# Patient Record
Sex: MALE | Race: BLACK OR AFRICAN AMERICAN
[De-identification: names, ages, dates, MRNs, and addresses within clinical notes are randomized per-mention and may not be internally consistent; named-entity substitution may affect disease eponyms.]

---

## 2023-07-31 ENCOUNTER — HOSPITAL ENCOUNTER (EMERGENCY)
Dept: HOSPITAL 33 - ED | Age: 23
Discharge: HOME | End: 2023-07-31
Payer: MEDICAID

## 2023-07-31 VITALS
DIASTOLIC BLOOD PRESSURE: 75 MMHG | HEART RATE: 89 BPM | SYSTOLIC BLOOD PRESSURE: 121 MMHG | OXYGEN SATURATION: 97 % | RESPIRATION RATE: 20 BRPM

## 2023-07-31 VITALS — TEMPERATURE: 98.3 F

## 2023-07-31 DIAGNOSIS — S93.601A: Primary | ICD-10-CM
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
## 2023-07-31 DIAGNOSIS — Z28.310: ICD-10-CM

## 2023-07-31 PROCEDURE — 73630 X-RAY EXAM OF FOOT: CPT

## 2023-07-31 PROCEDURE — 96372 THER/PROPH/DIAG INJ SC/IM: CPT

## 2023-07-31 PROCEDURE — 99283 EMERGENCY DEPT VISIT LOW MDM: CPT

## 2023-07-31 NOTE — XRAY
Indication: Pain following playing football/basketball.



Comparison: None



3 nonweightbearing views right foot obtained.  No bony, articular, or soft

tissue abnormalities.

## 2023-07-31 NOTE — ERPHSYRPT
- History of Present Illness


Time Seen by Provider: 07/31/23 01:12


Source: patient


Exam Limitations: no limitations


Patient Subjective Stated Complaint: pt states "I hurt my foot playing 

basketball and foot ball today."


Triage Nursing Assessment: pt transferred from wheelchair to cot by self, pt 

alert and oriented x3, skin pwd, pt c/o R foot/ankle pain playing basketball 

yesterday. pt rating 8/10. pt did not take any pain medication at home. no 

swelling noted, +2 pedal pulses noted


Physician History: 





23-year-old male presented in the ER with chief complaint of right foot pain and

swelling after he was playing basketball, running and jumping and started to 

have pain in the entire foot making it difficult weightbearing.  Moderate to 

severe sharp pain, exacerbated with movements and better with resting.  Did not 

twist his ankle/foot.  No injury/pain in the ankle.


Method of Injury: fell, sports injury


Occurred: this evening


Quality: sharpness


Severity of Pain-Max: moderate


Severity of Pain-Current: moderate


Lower Extremities Pain: foot: right


Modifying Factors: Improves With: immobilization.  Worsens With: movement


Associated Symptoms: unable to bear weight


Allergies/Adverse Reactions: 








No Known Drug Allergies Allergy (Verified 07/31/23 01:12)


   





Hx Tetanus, Diphtheria Vaccination/Date Given:  (unknown)


Hx Influenza Vaccination/Date Given: Yes


Hx Pneumococcal Vaccination/Date Given: No


Immunizations Up to Date: Yes





Travel Risk





- International Travel


Have you traveled outside of the country in past 3 weeks: No





- Coronavirus Screening


Are you exhibiting any of the following symptoms?: No


Close contact with a COVID-19 positive Pt in past 14-21 Days: No





- Vaccine Status


Have you recieved a Covid-19 vaccination: No





- Review of Systems


Constitutional: No Symptoms


Respiratory: No Symptoms


Cardiac: No Symptoms


Musculoskeletal: Injury, Joint Pain


Skin: No Symptoms


Neurological: No Symptoms


Endocrine: No Symptoms





- Past Medical History


Pertinent Past Medical History: No


Neurological History: No Pertinent History


ENT History: No Pertinent History


Cardiac History: No Pertinent History


Respiratory History: No Pertinent History


Endocrine Medical History: No Pertinent History


Musculoskeletal History: No Pertinent History


GI Medical History: No Pertinent History


 History: No Pertinent History


Psycho-Social History: No Pertinent History


Male Reproductive Disorders: No Pertinent History





- Past Surgical History


Past Surgical History: No


Neuro Surgical History: No Pertinent History


Cardiac: No Pertinent History


Respiratory: No Pertinent History


Gastrointestinal: No Pertinent History


Genitourinary: No Pertinent History


Musculoskeletal: No Pertinent History


Male Surgical History: No Pertinent History





- Social History


Smoking Status: Never smoker


Exposure to second hand smoke: No


Drug Use: none


Patient Lives Alone: No





- Nursing Vital Signs


Nursing Vital Signs: 





                               Initial Vital Signs











Temperature  98.3 F   07/31/23 01:14


 


Pulse Rate  84   07/31/23 01:14


 


Respiratory Rate  18   07/31/23 01:14


 


Blood Pressure  131/88   07/31/23 01:14


 


O2 Sat by Pulse Oximetry  98   07/31/23 01:14








                                   Pain Scale











Pain Intensity                 8

















- Physical Exam


General Appearance: no apparent distress


Neck Exam: normal inspection, full range of motion


Cardiovascular/Respiratory Exam: normal breath sounds, regular rate/rhythm


Back Exam: normal range of motion


Knees Exam: bilateral knee: non-tender, normal inspection, normal range of 

motion, no evidence of injury


Ankle Exam: bilateral ankle: non-tender, normal inspection, normal range of 

motion, no evidence of injury


Foot Exam: right foot: bone tenderness (Lateral aspect of proximal foot), 

limited range of motion, pain, soft tissue tenderness, swelling, left foot: non-

tender, normal inspection, normal range of motion, no evidence of injury


Neuro/Tendon Exam: normal sensation, normal motor functions


Mental Status Exam: alert, oriented x 3, cooperative


Skin Exam: normal color


**SpO2 Interpretation**: normal


SpO2: 98


O2 Delivery: Room Air


Ordered Tests: 





                               Active Orders 24 hr











 Category Date Time Status


 


 FOOT (MINIMUM 3 VIEWS) Stat Exams  07/31/23 01:16 Taken








Medication Summary














Discontinued Medications














Generic Name Dose Route Start Last Admin





  Trade Name Stefano  PRN Reason Stop Dose Admin


 


Ketorolac Tromethamine  30 mg  07/31/23 01:15  07/31/23 01:25





  Ketorolac Tromethamine 30 Mg/Ml Inj  IM  07/31/23 01:16  30 mg





  [facility]   Administration


 


Ketorolac Tromethamine  Confirm  07/31/23 01:18 





  Ketorolac Tromethamine 30 Mg/Ml Inj  Administered  07/31/23 01:19 





  Dose  





  30 mg  





  .ROUTE  





  [email or web address]  














- Progress


Progress Note: 





07/31/23 01:59


23-year-old male presented in the ER with chief complaint of right foot pain and

swelling after he was playing basketball, running and jumping and started to 

have pain in the entire foot making it difficult weightbearing.  Moderate to 

severe sharp pain, exacerbated with movements and better with resting.  Did not 

twist his ankle/foot.  No injury/pain in the ankle.





Given Toradol for symptomatic relief.  Has tenderness on the lateral aspect of 

right foot with some swelling.  Patient is having difficulty weightbearing.  X-

rays negative for fracture dislocation reviewed by me, official report is 

pending.  I will place him in a long walking boot and outpatient podiatry/Ortho 

follow-up recommended.  NSAIDs for symptomatic relief.


Counseled pt/family regarding: diagnosis, need for follow-up, rad results





Medical Desision Making





- Diagnostic Testing


Diagnostic test were ordered, analyzed, and reviewed by me: Yes


Radiological Interpretation: Interpreted by me, Reviewed by me





- Departure


Departure Disposition: Home


Clinical Impression: 


 Sprain of foot, right





Condition: Stable


Critical Care Time: No


Referrals: 


KORI ALCALA DPM [ACTIVE STAFF] - Follow up/PCP as directed (In the 

morning for reevaluation)


Instructions:  Foot Fracture (DC), Foot Sprain (DC)


Additional Instructions: 


Take Tylenol/ibuprofen as needed for pain.  Keep it elevated.  Weightbearing 

only as tolerated.  Avoid exertional activities.  Do not go back to the game 

until cleared by podiatry/Ortho.


Prescriptions: 


Ibuprofen 600 mg PO Q6HPRN PRN 10 Days #20 tablet


 PRN Reason: Pain